# Patient Record
Sex: MALE | Race: BLACK OR AFRICAN AMERICAN | NOT HISPANIC OR LATINO | Employment: FULL TIME | URBAN - METROPOLITAN AREA
[De-identification: names, ages, dates, MRNs, and addresses within clinical notes are randomized per-mention and may not be internally consistent; named-entity substitution may affect disease eponyms.]

---

## 2023-04-27 ENCOUNTER — OCCMED (OUTPATIENT)
Dept: URGENT CARE | Facility: CLINIC | Age: 28
End: 2023-04-27

## 2023-04-27 ENCOUNTER — APPOINTMENT (OUTPATIENT)
Dept: RADIOLOGY | Facility: CLINIC | Age: 28
End: 2023-04-27

## 2023-04-27 DIAGNOSIS — S39.012A STRAIN OF LUMBAR REGION, INITIAL ENCOUNTER: ICD-10-CM

## 2023-04-27 DIAGNOSIS — S39.012A STRAIN OF LUMBAR REGION, INITIAL ENCOUNTER: Primary | ICD-10-CM

## 2023-05-08 ENCOUNTER — OCCMED (OUTPATIENT)
Dept: URGENT CARE | Facility: CLINIC | Age: 28
End: 2023-05-08

## 2023-05-08 DIAGNOSIS — S39.012D STRAIN OF LUMBAR REGION, SUBSEQUENT ENCOUNTER: Primary | ICD-10-CM

## 2023-05-25 ENCOUNTER — APPOINTMENT (OUTPATIENT)
Dept: URGENT CARE | Facility: CLINIC | Age: 28
End: 2023-05-25

## 2023-10-27 ENCOUNTER — HOSPITAL ENCOUNTER (EMERGENCY)
Facility: HOSPITAL | Age: 28
Discharge: HOME | End: 2023-10-27
Attending: EMERGENCY MEDICINE | Admitting: EMERGENCY MEDICINE
Payer: COMMERCIAL

## 2023-10-27 VITALS
RESPIRATION RATE: 20 BRPM | HEART RATE: 99 BPM | TEMPERATURE: 97.9 F | HEIGHT: 67 IN | BODY MASS INDEX: 31.39 KG/M2 | OXYGEN SATURATION: 97 % | SYSTOLIC BLOOD PRESSURE: 139 MMHG | WEIGHT: 200 LBS | DIASTOLIC BLOOD PRESSURE: 83 MMHG

## 2023-10-27 DIAGNOSIS — Z20.2 EXPOSURE TO SEXUALLY TRANSMITTED DISEASE (STD): Primary | ICD-10-CM

## 2023-10-27 LAB
HIV 1+2 AB+HIV1 P24 AG SERPL QL IA: NORMAL
HIV1 P24 AG SER QL: NORMAL

## 2023-10-27 PROCEDURE — 99283 EMERGENCY DEPT VISIT LOW MDM: CPT

## 2023-10-27 PROCEDURE — 87491 CHLMYD TRACH DNA AMP PROBE: CPT | Performed by: PHYSICIAN ASSISTANT

## 2023-10-27 PROCEDURE — 87806 HIV AG W/HIV1&2 ANTB W/OPTIC: CPT | Performed by: PHYSICIAN ASSISTANT

## 2023-10-27 PROCEDURE — 87591 N.GONORRHOEAE DNA AMP PROB: CPT | Performed by: PHYSICIAN ASSISTANT

## 2023-10-27 PROCEDURE — 99284 EMERGENCY DEPT VISIT MOD MDM: CPT | Performed by: PHYSICIAN ASSISTANT

## 2023-10-27 PROCEDURE — 36415 COLL VENOUS BLD VENIPUNCTURE: CPT | Performed by: PHYSICIAN ASSISTANT

## 2023-10-27 RX ORDER — EMTRICITABINE 200 MG/1
200 CAPSULE ORAL DAILY
Status: DISCONTINUED | OUTPATIENT
Start: 2023-10-27 | End: 2023-10-27

## 2023-10-27 RX ORDER — EMTRICITABINE 200 MG/1
200 CAPSULE ORAL DAILY
Status: DISCONTINUED | OUTPATIENT
Start: 2023-10-27 | End: 2023-10-28 | Stop reason: HOSPADM

## 2023-10-27 RX ORDER — EMTRICITABINE AND TENOFOVIR DISOPROXIL FUMARATE 200; 300 MG/1; MG/1
1 TABLET, FILM COATED ORAL DAILY
Qty: 28 TABLET | Refills: 0 | Status: SHIPPED | OUTPATIENT
Start: 2023-10-27 | End: 2023-11-24

## 2023-10-27 RX ORDER — TENOFOVIR DISOPROXIL FUMARATE 300 MG/1
300 TABLET, FILM COATED ORAL DAILY
Status: DISCONTINUED | OUTPATIENT
Start: 2023-10-27 | End: 2023-10-27

## 2023-10-27 RX ORDER — TENOFOVIR DISOPROXIL FUMARATE 300 MG/1
300 TABLET, FILM COATED ORAL DAILY
Status: DISCONTINUED | OUTPATIENT
Start: 2023-10-27 | End: 2023-10-28 | Stop reason: HOSPADM

## 2023-10-27 RX ADMIN — TENOFOVIR DISOPROXIL FUMARATE 300 MG: 300 TABLET, FILM COATED ORAL at 22:22

## 2023-10-27 RX ADMIN — RALTEGRAVIR 400 MG: 400 TABLET, FILM COATED ORAL at 22:22

## 2023-10-27 RX ADMIN — EMTRICITABINE 200 MG: 200 CAPSULE ORAL at 22:22

## 2023-10-28 NOTE — ED PROVIDER NOTES
History  Chief Complaint   Patient presents with    Exposure to STD     Pt states a partner he was intimate with told him he is positive for hiv. Denies penile/urinary s/s     Patient is a 54-year-old male with no significant past medical history presenting to the emergency department for potential exposure to STD. Patient states that he was having sex with another male 2 to 3 days ago. Patient was not wearing a condom, he was the insertive partner. He denies receptive anal intercourse. After having sex, he was informed that his partner did test positive for HIV. Patient is not offering any complaints at this time. He is not having any penile discharge, penile pain, testicular pain, scrotal swelling, rectal pain, abdominal pain, fevers, chills, nausea, vomiting, diarrhea. He is requesting to be tested for STDs here today. I also discussed postexposure prophylaxis with the patient who is agreeable to start postexposure prophylaxis. No other complaints at this time          None       History reviewed. No pertinent past medical history. History reviewed. No pertinent surgical history. History reviewed. No pertinent family history. I have reviewed and agree with the history as documented. E-Cigarette/Vaping     E-Cigarette/Vaping Substances          Review of Systems   Constitutional:  Negative for chills and fever. HENT:  Negative for congestion, rhinorrhea and sore throat. Respiratory:  Negative for cough, chest tightness and shortness of breath. Cardiovascular:  Negative for chest pain and palpitations. Gastrointestinal:  Negative for abdominal pain, diarrhea, nausea and vomiting. Genitourinary:  Negative for dysuria, penile discharge, penile pain, penile swelling, scrotal swelling and testicular pain. All other systems reviewed and are negative. Physical Exam  Physical Exam  Vitals reviewed. Constitutional:       General: He is not in acute distress.      Appearance: Normal appearance. He is normal weight. He is not ill-appearing, toxic-appearing or diaphoretic. HENT:      Head: Normocephalic and atraumatic. Right Ear: External ear normal.      Left Ear: External ear normal.   Eyes:      General: No scleral icterus. Right eye: No discharge. Left eye: No discharge. Extraocular Movements: Extraocular movements intact. Conjunctiva/sclera: Conjunctivae normal.   Cardiovascular:      Rate and Rhythm: Normal rate and regular rhythm. Heart sounds: Normal heart sounds. No murmur heard. No friction rub. No gallop. Pulmonary:      Effort: Pulmonary effort is normal. No respiratory distress. Breath sounds: Normal breath sounds. No stridor. No wheezing, rhonchi or rales. Musculoskeletal:      Cervical back: Normal range of motion and neck supple. Skin:     General: Skin is warm and dry. Neurological:      Mental Status: He is alert and oriented to person, place, and time. Psychiatric:         Mood and Affect: Mood normal.         Behavior: Behavior normal.         Vital Signs  ED Triage Vitals [10/27/23 2136]   Temperature Pulse Respirations Blood Pressure SpO2   97.9 °F (36.6 °C) 99 20 139/83 97 %      Temp Source Heart Rate Source Patient Position - Orthostatic VS BP Location FiO2 (%)   Oral Monitor Sitting Left arm --      Pain Score       --           Vitals:    10/27/23 2136   BP: 139/83   Pulse: 99   Patient Position - Orthostatic VS: Sitting         Visual Acuity      ED Medications  Medications   raltegravir (ISENTRESS) tablet 400 mg (has no administration in time range)   emtricitabine (EMTRIVA) capsule 200 mg (has no administration in time range)     And   tenofovir (VIREAD) tablet 300 mg (has no administration in time range)       Diagnostic Studies  Results Reviewed       Procedure Component Value Units Date/Time    Chlamydia/GC amplified DNA by PCR [[id number]] Collected: 10/27/23 2151    Lab Status:  In process Specimen: Urine, Other Updated: 10/27/23 2155    RAPID HIV 1/2 AB-AG COMBO for 15years old and above [[id number]] Collected: 10/27/23 2151    Lab Status: In process Specimen: Blood from Arm, Right Updated: 10/27/23 2155                   No orders to display              Procedures  Procedures         ED Course                               SBIRT 22yo+      Flowsheet Row Most Recent Value   Initial Alcohol Screen: US AUDIT-C     1. How often do you have a drink containing alcohol? 0 Filed at: 10/27/2023 2147   2. How many drinks containing alcohol do you have on a typical day you are drinking? 0 Filed at: 10/27/2023 2147   3a. Male UNDER 65: How often do you have five or more drinks on one occasion? 0 Filed at: 10/27/2023 2147   3b. FEMALE Any Age, or MALE 65+: How often do you have 4 or more drinks on one occassion? 0 Filed at: 10/27/2023 2147   Audit-C Score 0 Filed at: 10/27/2023 2147   LADAN: How many times in the past year have you. .. Used an illegal drug or used a prescription medication for non-medical reasons? Never Filed at: 10/27/2023 2147                      Medical Decision Making  Patient presented to the emergency department after exposure to STD. Upon arrival patient appears comfortable. He does not appear to be in any acute distress. His vital signs are unremarkable. Physical examination benign. Patient not offering any symptoms at this time. HIV testing, gonorrhea/chlamydia testing obtained. Patient does express interest and postexposure prophylaxis which was provided to him. He does not wish to be treated for gonorrhea/chlamydia until his test results come back. Patient was given prescription for postexposure prophylaxis. He was also given instructions to follow-up in 6 to 8 weeks for confirmatory HIV testing even if his HIV testing is negative today. He was given information for outpatient clinics. Strict return precautions were discussed.   Patient is in stable condition at time of discharge. Amount and/or Complexity of Data Reviewed  Labs: ordered. Risk  Prescription drug management. Disposition  Final diagnoses:   Exposure to sexually transmitted disease (STD)     Time reflects when diagnosis was documented in both MDM as applicable and the Disposition within this note       Time User Action Codes Description Comment    10/27/2023  9:56 PM Kennedy Sosa Add [Z20.2] Exposure to sexually transmitted disease (STD)           ED Disposition       ED Disposition   Discharge    Condition   Stable    Date/Time   Fri Oct 27, 2023 2154    Comment   Shellie Connor discharge to home/self care. Follow-up Information       Follow up With Specialties Details Why Contact Info Additional [facility] Emergency Department Emergency Medicine Go to  If symptoms worsen [address] [facility] Emergency Department, [place], [id number]            Patient's Medications   Discharge Prescriptions    EMTRICITABINE-TENOFOVIR (TRUVADA) 200-300 MG PER TABLET    Take 1 tablet by mouth daily for 28 days       Start Date: 10/27/2023End Date: 11/24/2023       Order Dose: 1 tablet       Quantity: 28 tablet    Refills: 0    RALTEGRAVIR (ISENTRESS) 400 MG TABLET    Take 1 tablet (400 mg total) by mouth 2 (two) times a day for 28 days       Start Date: 10/27/2023End Date: 11/24/2023       Order Dose: 400 mg       Quantity: 56 tablet    Refills: 0       No discharge procedures on file.     PDMP Review       None            ED Provider  Electronically Signed by             Lashell Nick PA-C  10/27/23 [id number]

## 2023-10-29 LAB
C TRACH DNA SPEC QL NAA+PROBE: POSITIVE
N GONORRHOEA DNA SPEC QL NAA+PROBE: NEGATIVE

## 2023-10-31 NOTE — RESULT ENCOUNTER NOTE
2nd unsuccessful attempt made. Voicemail box not set up, unable to leave message. Will send certified letter.